# Patient Record
Sex: MALE | Race: WHITE | NOT HISPANIC OR LATINO | URBAN - METROPOLITAN AREA
[De-identification: names, ages, dates, MRNs, and addresses within clinical notes are randomized per-mention and may not be internally consistent; named-entity substitution may affect disease eponyms.]

---

## 2021-07-20 ENCOUNTER — EMERGENCY (EMERGENCY)
Facility: HOSPITAL | Age: 51
LOS: 1 days | Discharge: HOME | End: 2021-07-20
Attending: EMERGENCY MEDICINE | Admitting: STUDENT IN AN ORGANIZED HEALTH CARE EDUCATION/TRAINING PROGRAM
Payer: COMMERCIAL

## 2021-07-20 VITALS
TEMPERATURE: 98 F | OXYGEN SATURATION: 100 % | DIASTOLIC BLOOD PRESSURE: 79 MMHG | HEART RATE: 69 BPM | SYSTOLIC BLOOD PRESSURE: 162 MMHG | RESPIRATION RATE: 18 BRPM

## 2021-07-20 VITALS
HEART RATE: 73 BPM | OXYGEN SATURATION: 100 % | TEMPERATURE: 98 F | RESPIRATION RATE: 17 BRPM | SYSTOLIC BLOOD PRESSURE: 152 MMHG | DIASTOLIC BLOOD PRESSURE: 83 MMHG

## 2021-07-20 PROCEDURE — 73030 X-RAY EXAM OF SHOULDER: CPT | Mod: 26,LT

## 2021-07-20 PROCEDURE — 99284 EMERGENCY DEPT VISIT MOD MDM: CPT | Mod: 57

## 2021-07-20 PROCEDURE — 23650 CLTX SHO DSLC W/MNPJ WO ANES: CPT | Mod: 54

## 2021-07-20 RX ORDER — LIDOCAINE HCL 20 MG/ML
20 VIAL (ML) INJECTION ONCE
Refills: 0 | Status: COMPLETED | OUTPATIENT
Start: 2021-07-20 | End: 2021-07-20

## 2021-07-20 RX ORDER — MORPHINE SULFATE 50 MG/1
4 CAPSULE, EXTENDED RELEASE ORAL ONCE
Refills: 0 | Status: DISCONTINUED | OUTPATIENT
Start: 2021-07-20 | End: 2021-07-20

## 2021-07-20 RX ORDER — PROPOFOL 10 MG/ML
20 INJECTION, EMULSION INTRAVENOUS ONCE
Refills: 0 | Status: COMPLETED | OUTPATIENT
Start: 2021-07-20 | End: 2021-07-20

## 2021-07-20 RX ADMIN — MORPHINE SULFATE 4 MILLIGRAM(S): 50 CAPSULE, EXTENDED RELEASE ORAL at 21:46

## 2021-07-20 RX ADMIN — MORPHINE SULFATE 4 MILLIGRAM(S): 50 CAPSULE, EXTENDED RELEASE ORAL at 22:27

## 2021-07-20 RX ADMIN — MORPHINE SULFATE 4 MILLIGRAM(S): 50 CAPSULE, EXTENDED RELEASE ORAL at 22:20

## 2021-07-20 RX ADMIN — PROPOFOL 20 MILLIGRAM(S): 10 INJECTION, EMULSION INTRAVENOUS at 22:40

## 2021-07-20 RX ADMIN — Medication 20 MILLILITER(S): at 21:33

## 2021-07-20 NOTE — ED ADULT TRIAGE NOTE - CHIEF COMPLAINT QUOTE
Arrives from home c/o injury during softball game to left shoulder now with pain and limited mobility, observed deformity in triage.

## 2021-07-20 NOTE — ED PROCEDURE NOTE - NS_POSTPROCCAREGUIDE_ED_ALL_ED
Patient is now fully awake, with vital signs and temperature stable, hydration is adequate, patients Ximena’s  score is at baseline (or greater than 8), patient and escort has received  discharge education.

## 2021-07-20 NOTE — ED ADULT NURSE REASSESSMENT NOTE - NS ED NURSE REASSESS COMMENT FT1
Pt. A&Ox4, in no acute distress. Respirations even & unlabored. Awaiting repeat xray result. Will continue to monitor.

## 2021-07-20 NOTE — ED PROVIDER NOTE - NSFOLLOWUPINSTRUCTIONS_ED_ALL_ED_FT
You were seen for a closed reduction. You should call to follow up with an orthopedic specialist as soon as possible.  •Acetaminophen decreases pain and fever. It is available without a doctor's order. Ask how much to take and how often to take it. Follow directions. Read the labels of all other medicines you are using to see if they also contain acetaminophen, or ask your doctor or pharmacist. Acetaminophen can cause liver damage if not taken correctly. Do not use more than 4 grams (4,000 milligrams) total of acetaminophen in one day.   •NSAIDs, such as ibuprofen, help decrease swelling, pain, and fever. NSAIDs can cause stomach bleeding or kidney problems in certain people. If you take blood thinner medicine, always ask if NSAIDs are safe for you. Always read the medicine label and follow directions. Do not give these medicines to children under 6 months of age without direction from your child's healthcare provider.  •Prescription pain medicine may be given. Ask your healthcare provider how to take this medicine safely. Some prescription pain medicines contain acetaminophen. Do not take other medicines that contain acetaminophen without talking to your healthcare provider. Too much acetaminophen may cause liver damage. Prescription pain medicine may cause constipation. Ask your healthcare provider how to prevent or treat constipation.   •Take your medicine as directed. Contact your healthcare provider if you think your medicine is not helping or if you have side effects. Tell him or her if you are allergic to any medicine. Keep a list of the medicines, vitamins, and herbs you take. Include the amounts, and when and why you take them. Bring the list or the pill bottles to follow-up visits. Carry your medicine list with you in case of an emergency.    Care for yourself at home:   •Use crutches or a walker as directed if you broke a bone in your leg or foot. These devices will help you walk and take some weight off your injured leg or foot.  •Rest your limb as much as possible. Ask your healthcare provider when you can return to daily activities. You will need to limit activities or exercise while your bone heals.  •Elevate your injured area above the level of your heart as often as you can. This will help decrease swelling and pain. Prop your cast or splint on pillows or blankets to keep it elevated comfortably.   •Apply ice on your broken bone for 15 to 20 minutes every hour or as directed. Use an ice pack, or put crushed ice in a plastic bag. Cover it with a towel. Ice helps prevent tissue damage and decreases swelling and pain.

## 2021-07-20 NOTE — ED PROVIDER NOTE - OBJECTIVE STATEMENT
51M PMH HTN, HLD, here for concern of L shoulder dislocation while playing softball. Reached in front with his L arm and dived forward landing on his arm. Feels lightheaded and dizzy likely 2/2 long time between now last meal, 8/10 pain, no numbness or tingling in the L arm, but does endorse a dull ache. No abrasions to the area. Occurred about 7:30 PM

## 2021-07-20 NOTE — ED PROVIDER NOTE - ATTENDING CONTRIBUTION TO CARE
DR. BLOCH, ATTENDING MD-  I performed a face to face bedside interview with patient regarding history of present illness, review of symptoms and past medical history. I completed an independent physical exam.  I have discussed patient's plan of care with the resident.  patient examined, well appearing NAD HEENT nml heart s1s2, lungs clear, abd soft nontender, left shoulder with inferior dislocation deformity, sensation in deltoid triangle present, distal sensation intact nml radial pulse.lower extrem no edema, pulses intact.

## 2021-07-20 NOTE — ED PROVIDER NOTE - PROGRESS NOTE DETAILS
Leatha MASON PGY2: Patient tolerated reduction well. Breathing unlabored, VSS, cleared for dc with ortho f/u Leatha Ramirez EM PGY2: Patient tolerated reduction well. Breathing unlabored, VSS, cleared for dc with ortho f/u  Post reduction film with relocation.

## 2021-07-20 NOTE — ED PROVIDER NOTE - PHYSICAL EXAMINATION
Gen: WDWN, NAD  HEENT: EOMI, no nasal discharge, mucous membranes moist  CV: RRR, +S1/S2, no M/R/G  Resp: CTAB, no W/R/R  GI: Abdomen soft non-distended, NTTP, no masses  MSK: No open wounds, no bruising, no LE edema; (+) shoulder dislocation, likely anteroinferior in nature  Neuro: A&Ox4, following commands, moving all four extremities spontaneously  Psych: appropriate mood, denies AH, VH, SI

## 2021-07-20 NOTE — ED PROVIDER NOTE - CLINICAL SUMMARY MEDICAL DECISION MAKING FREE TEXT BOX
51M here for L shoulder dislocation while playing softball. Neurovascularly intact. VSS. PE concerning for anteroinferior dislocation. Likely reduction under conscious sedation and d/c with sports med/ortho clinic.

## 2021-07-20 NOTE — ED ADULT NURSE NOTE - OBJECTIVE STATEMENT
RN facilitator: pt received to room 24 c/o left shoulder pain s/p fall while playing softball. pt states he landed on left arm while t was internally rotated. obvious deformity noted to left shoulder. PMS intact LUE. 20G IV placed right hand. handoff report given to PAVITHRA Tee.

## 2021-07-20 NOTE — ED PROVIDER NOTE - NSFOLLOWUPCLINICS_GEN_ALL_ED_FT
[facility] Orthopedic [facility]  Orthopedic Surgery  [address], [address]rd & [address]th [place] [address]  Phone: [phone]  Fax:     [facility]  Orthopedic Surgery  [address] [address]  Phone: [phone]  Fax:

## 2021-07-20 NOTE — ED PROVIDER NOTE - NS ED ROS FT
Gen: No fever, normal appetite  Eyes: No eye irritation or discharge  ENT: No ear pain, congestion, sore throat  Resp: No cough or trouble breathing  Cardiovascular: No chest pain or palpitation  Gastroenteric: No nausea/vomiting, diarrhea, constipation  :  No change in urine output; no dysuria  MS: (+) L arm dislocation  Skin: No rashes  Neuro: No headache; no abnormal movements  Remainder negative, except as per the HPI

## 2021-07-20 NOTE — ED PROVIDER NOTE - PATIENT PORTAL LINK FT
You can access the FollowMyHealth Patient Portal offered by [facility] by registering at the following website: [email or web address]. By joining [facility]’s FollowMyHealth portal, you will also be able to view your health information using other applications (apps) compatible with our system.

## 2021-07-20 NOTE — ED ADULT NURSE REASSESSMENT NOTE - NS ED NURSE REASSESS COMMENT FT1
Report received from facilitator RN. Pt. A&Ox4, c/o shoulder pain. Facilitator RN at bedside at present with MD & ED tech for conscious sedation.

## 2021-07-21 NOTE — ED POST DISCHARGE NOTE - REASON FOR FOLLOW-UP
Telephone follow up   to see how patient feeling after receiving procedural sedation. message left with . Other

## 2022-11-04 NOTE — ED PROCEDURE NOTE - PROCEDURE DATE TIME, MLM
Patient reports chest pain that has since resolved with resolution of SVT. Denies CP on my exam   -Elevation likely due to demand ischemia.  -Troponin 0.027,0.202.  -Cardiac monitoring.  - cardiology consulted, appreciate recs above  
20-Jul-2021 22:26
20-Jul-2021 22:54